# Patient Record
Sex: FEMALE | Race: WHITE | NOT HISPANIC OR LATINO | URBAN - METROPOLITAN AREA
[De-identification: names, ages, dates, MRNs, and addresses within clinical notes are randomized per-mention and may not be internally consistent; named-entity substitution may affect disease eponyms.]

---

## 2017-02-01 ENCOUNTER — EMERGENCY (EMERGENCY)
Facility: HOSPITAL | Age: 27
LOS: 1 days | Discharge: HOME | End: 2017-02-01
Attending: EMERGENCY MEDICINE | Admitting: EMERGENCY MEDICINE
Payer: SELF-PAY

## 2017-02-01 VITALS
SYSTOLIC BLOOD PRESSURE: 125 MMHG | DIASTOLIC BLOOD PRESSURE: 70 MMHG | HEIGHT: 64 IN | HEART RATE: 98 BPM | TEMPERATURE: 98 F | RESPIRATION RATE: 18 BRPM | WEIGHT: 145.06 LBS | OXYGEN SATURATION: 96 %

## 2017-02-01 DIAGNOSIS — J02.9 ACUTE PHARYNGITIS, UNSPECIFIED: ICD-10-CM

## 2017-02-01 DIAGNOSIS — R05 COUGH: ICD-10-CM

## 2017-02-01 LAB
ALBUMIN SERPL ELPH-MCNC: 3.7 G/DL — SIGNIFICANT CHANGE UP (ref 3.4–5)
ALP SERPL-CCNC: 78 U/L — SIGNIFICANT CHANGE UP (ref 40–120)
ALT FLD-CCNC: 16 U/L — SIGNIFICANT CHANGE UP (ref 12–42)
ANION GAP SERPL CALC-SCNC: 7 MMOL/L — LOW (ref 9–16)
AST SERPL-CCNC: 9 U/L — LOW (ref 15–37)
BILIRUB SERPL-MCNC: 1.1 MG/DL — SIGNIFICANT CHANGE UP (ref 0.2–1.2)
BUN SERPL-MCNC: 18 MG/DL — SIGNIFICANT CHANGE UP (ref 7–23)
CALCIUM SERPL-MCNC: 8.5 MG/DL — SIGNIFICANT CHANGE UP (ref 8.5–10.5)
CHLORIDE SERPL-SCNC: 105 MMOL/L — SIGNIFICANT CHANGE UP (ref 96–108)
CO2 SERPL-SCNC: 25 MMOL/L — SIGNIFICANT CHANGE UP (ref 22–31)
CREAT SERPL-MCNC: 0.88 MG/DL — SIGNIFICANT CHANGE UP (ref 0.5–1.3)
GLUCOSE SERPL-MCNC: 84 MG/DL — SIGNIFICANT CHANGE UP (ref 70–99)
HCG SERPL-ACNC: <1 MIU/ML — SIGNIFICANT CHANGE UP
HCT VFR BLD CALC: 38.1 % — SIGNIFICANT CHANGE UP (ref 34.5–45)
HGB BLD-MCNC: 13.2 G/DL — SIGNIFICANT CHANGE UP (ref 11.5–15.5)
MCHC RBC-ENTMCNC: 31.4 PG — SIGNIFICANT CHANGE UP (ref 27–34)
MCHC RBC-ENTMCNC: 34.6 G/DL — SIGNIFICANT CHANGE UP (ref 32–36)
MCV RBC AUTO: 90.5 FL — SIGNIFICANT CHANGE UP (ref 80–100)
PLATELET # BLD AUTO: 198 K/UL — SIGNIFICANT CHANGE UP (ref 150–400)
POTASSIUM SERPL-MCNC: 3.9 MMOL/L — SIGNIFICANT CHANGE UP (ref 3.5–5.3)
POTASSIUM SERPL-SCNC: 3.9 MMOL/L — SIGNIFICANT CHANGE UP (ref 3.5–5.3)
PROT SERPL-MCNC: 7.5 G/DL — SIGNIFICANT CHANGE UP (ref 6.4–8.2)
RAPID RVP RESULT: SIGNIFICANT CHANGE UP
RBC # BLD: 4.21 M/UL — SIGNIFICANT CHANGE UP (ref 3.8–5.2)
RBC # FLD: 12 % — SIGNIFICANT CHANGE UP (ref 10.3–16.9)
SODIUM SERPL-SCNC: 137 MMOL/L — SIGNIFICANT CHANGE UP (ref 135–145)
WBC # BLD: 7.8 K/UL — SIGNIFICANT CHANGE UP (ref 3.8–10.5)
WBC # FLD AUTO: 7.8 K/UL — SIGNIFICANT CHANGE UP (ref 3.8–10.5)

## 2017-02-01 PROCEDURE — 85027 COMPLETE CBC AUTOMATED: CPT

## 2017-02-01 PROCEDURE — 87581 M.PNEUMON DNA AMP PROBE: CPT

## 2017-02-01 PROCEDURE — 99283 EMERGENCY DEPT VISIT LOW MDM: CPT | Mod: 25

## 2017-02-01 PROCEDURE — 71046 X-RAY EXAM CHEST 2 VIEWS: CPT

## 2017-02-01 PROCEDURE — 94640 AIRWAY INHALATION TREATMENT: CPT

## 2017-02-01 PROCEDURE — 87486 CHLMYD PNEUM DNA AMP PROBE: CPT

## 2017-02-01 PROCEDURE — 87798 DETECT AGENT NOS DNA AMP: CPT

## 2017-02-01 PROCEDURE — 87633 RESP VIRUS 12-25 TARGETS: CPT

## 2017-02-01 PROCEDURE — 99284 EMERGENCY DEPT VISIT MOD MDM: CPT

## 2017-02-01 PROCEDURE — 84702 CHORIONIC GONADOTROPIN TEST: CPT

## 2017-02-01 PROCEDURE — 71020: CPT | Mod: 26

## 2017-02-01 PROCEDURE — 80053 COMPREHEN METABOLIC PANEL: CPT

## 2017-02-01 PROCEDURE — 36415 COLL VENOUS BLD VENIPUNCTURE: CPT

## 2017-02-01 RX ORDER — IPRATROPIUM/ALBUTEROL SULFATE 18-103MCG
3 AEROSOL WITH ADAPTER (GRAM) INHALATION ONCE
Qty: 0 | Refills: 0 | Status: COMPLETED | OUTPATIENT
Start: 2017-02-01 | End: 2017-02-01

## 2017-02-01 RX ORDER — ALBUTEROL 90 UG/1
2 AEROSOL, METERED ORAL
Qty: 3 | Refills: 0 | OUTPATIENT
Start: 2017-02-01 | End: 2017-03-03

## 2017-02-01 RX ADMIN — Medication 3 MILLILITER(S): at 18:51

## 2017-02-01 NOTE — ED PROVIDER NOTE - ENMT, MLM
Mild erythema as right posterior oropharynx. No sinus tenderness. Mild b/l posterior cervical lymphadenoapthy.

## 2017-02-01 NOTE — ED PROVIDER NOTE - OBJECTIVE STATEMENT
25yo F with no significant PMHx presents with 2 weeks of cough and feeling unwell. Reports symptoms started as rash on her head that was itchy as well as URI type symptoms including sore throat, productive cough, nasal congestion and body aches. She took a week of amoxicillin that she completed on Thursday but symptoms persisted. Most bothersome symptom is cough that was initially productive with whitish sputum now more dry cough, constant, not relieved with Robitussin. Associated with dyspnea while at rest. Had low grade temp on Sunday (99.1). Denies chills, lightheadedness, sinus pain, chest pain, abdominal pain, dysuria, diarrhea or other skin rash. No recent sick contacts or hospitalizations. Returned from [place] at end of December, no noticeable rashes or bites. Received flu shot in Sept/Oct.

## 2017-02-01 NOTE — ED PROVIDER NOTE - PROGRESS NOTE DETAILS
CXR with acute infiltrates. Labs unremarkable. Given nebulization with improvement in symptoms. symptoms likely 2/2 viral syndrome. Will give rx for albuterol.

## 2017-02-01 NOTE — ED PROVIDER NOTE - MEDICAL DECISION MAKING DETAILS
26yoF with no significant PMHx presents with worsening cough, dyspnea, sore throat and myalgias for 2 weeks despite completing week of amoxicillin. HD stable, afebrile. Mild erythema in posterior pharnyx but no exudates, lungs CTA b/l. Will check RVP, CBC, and CXR.

## 2017-02-01 NOTE — ED PROVIDER NOTE - ATTENDING CONTRIBUTION TO CARE
pt seen and examined by me, agree with above plan.  healthy 25 yo female with 2 weeks of dry cough, worse at night.  was initially treated with amoxicillin by pmd for ?throat infection.  no fever.  mild sob.  on exam well appearing, nad, heent normal, heart normal, lungs clear.  cxr normal.  probable viral uri with persistent cough.  pt may benefit from albuterol inhaler, fu pmd

## 2017-02-01 NOTE — ED PROVIDER NOTE - CONSTITUTIONAL, MLM
normal... Well appearing, well nourished, awake, alert, oriented to person, place, time/situation and in no apparent distress. Mildly flushed.

## 2017-06-26 ENCOUNTER — EMERGENCY (EMERGENCY)
Facility: HOSPITAL | Age: 27
LOS: 1 days | Discharge: HOME | End: 2017-06-26
Attending: EMERGENCY MEDICINE | Admitting: EMERGENCY MEDICINE
Payer: COMMERCIAL

## 2017-06-26 VITALS
TEMPERATURE: 98 F | SYSTOLIC BLOOD PRESSURE: 113 MMHG | DIASTOLIC BLOOD PRESSURE: 64 MMHG | OXYGEN SATURATION: 100 % | HEART RATE: 80 BPM | RESPIRATION RATE: 18 BRPM

## 2017-06-26 VITALS
WEIGHT: 149.91 LBS | HEART RATE: 107 BPM | TEMPERATURE: 98 F | RESPIRATION RATE: 16 BRPM | SYSTOLIC BLOOD PRESSURE: 132 MMHG | DIASTOLIC BLOOD PRESSURE: 85 MMHG | OXYGEN SATURATION: 97 %

## 2017-06-26 DIAGNOSIS — O20.9 HEMORRHAGE IN EARLY PREGNANCY, UNSPECIFIED: ICD-10-CM

## 2017-06-26 DIAGNOSIS — R00.0 TACHYCARDIA, UNSPECIFIED: ICD-10-CM

## 2017-06-26 DIAGNOSIS — Z3A.01 LESS THAN 8 WEEKS GESTATION OF PREGNANCY: ICD-10-CM

## 2017-06-26 DIAGNOSIS — Z79.899 OTHER LONG TERM (CURRENT) DRUG THERAPY: ICD-10-CM

## 2017-06-26 DIAGNOSIS — R10.2 PELVIC AND PERINEAL PAIN: ICD-10-CM

## 2017-06-26 LAB
HCT VFR BLD CALC: 37.8 % — SIGNIFICANT CHANGE UP (ref 34.5–45)
HGB BLD-MCNC: 12.8 G/DL — SIGNIFICANT CHANGE UP (ref 11.5–15.5)
MCHC RBC-ENTMCNC: 30 PG — SIGNIFICANT CHANGE UP (ref 27–34)
MCHC RBC-ENTMCNC: 33.9 G/DL — SIGNIFICANT CHANGE UP (ref 32–36)
MCV RBC AUTO: 88.7 FL — SIGNIFICANT CHANGE UP (ref 80–100)
PLATELET # BLD AUTO: 218 K/UL — SIGNIFICANT CHANGE UP (ref 150–400)
RBC # BLD: 4.26 M/UL — SIGNIFICANT CHANGE UP (ref 3.8–5.2)
RBC # FLD: 11.7 % — SIGNIFICANT CHANGE UP (ref 10.3–16.9)
WBC # BLD: 9.3 K/UL — SIGNIFICANT CHANGE UP (ref 3.8–10.5)
WBC # FLD AUTO: 9.3 K/UL — SIGNIFICANT CHANGE UP (ref 3.8–10.5)

## 2017-06-26 PROCEDURE — 76830 TRANSVAGINAL US NON-OB: CPT

## 2017-06-26 PROCEDURE — 76801 OB US < 14 WKS SINGLE FETUS: CPT | Mod: 26

## 2017-06-26 PROCEDURE — 84702 CHORIONIC GONADOTROPIN TEST: CPT

## 2017-06-26 PROCEDURE — 96375 TX/PRO/DX INJ NEW DRUG ADDON: CPT

## 2017-06-26 PROCEDURE — 81001 URINALYSIS AUTO W/SCOPE: CPT

## 2017-06-26 PROCEDURE — 86900 BLOOD TYPING SEROLOGIC ABO: CPT

## 2017-06-26 PROCEDURE — 76817 TRANSVAGINAL US OBSTETRIC: CPT

## 2017-06-26 PROCEDURE — 76801 OB US < 14 WKS SINGLE FETUS: CPT

## 2017-06-26 PROCEDURE — 99285 EMERGENCY DEPT VISIT HI MDM: CPT

## 2017-06-26 PROCEDURE — 76830 TRANSVAGINAL US NON-OB: CPT | Mod: 26

## 2017-06-26 PROCEDURE — 86850 RBC ANTIBODY SCREEN: CPT

## 2017-06-26 PROCEDURE — 99284 EMERGENCY DEPT VISIT MOD MDM: CPT | Mod: 25

## 2017-06-26 PROCEDURE — 85027 COMPLETE CBC AUTOMATED: CPT

## 2017-06-26 PROCEDURE — 85025 COMPLETE CBC W/AUTO DIFF WBC: CPT

## 2017-06-26 PROCEDURE — 36415 COLL VENOUS BLD VENIPUNCTURE: CPT

## 2017-06-26 PROCEDURE — 86901 BLOOD TYPING SEROLOGIC RH(D): CPT

## 2017-06-26 PROCEDURE — 96374 THER/PROPH/DIAG INJ IV PUSH: CPT

## 2017-06-26 PROCEDURE — 80053 COMPREHEN METABOLIC PANEL: CPT

## 2017-06-26 RX ORDER — MORPHINE SULFATE 50 MG/1
4 CAPSULE, EXTENDED RELEASE ORAL ONCE
Qty: 0 | Refills: 0 | Status: DISCONTINUED | OUTPATIENT
Start: 2017-06-26 | End: 2017-06-26

## 2017-06-26 RX ORDER — SODIUM CHLORIDE 9 MG/ML
500 INJECTION INTRAMUSCULAR; INTRAVENOUS; SUBCUTANEOUS ONCE
Qty: 0 | Refills: 0 | Status: COMPLETED | OUTPATIENT
Start: 2017-06-26 | End: 2017-06-26

## 2017-06-26 RX ORDER — METOCLOPRAMIDE HCL 10 MG
10 TABLET ORAL ONCE
Qty: 0 | Refills: 0 | Status: COMPLETED | OUTPATIENT
Start: 2017-06-26 | End: 2017-06-26

## 2017-06-26 RX ORDER — SODIUM CHLORIDE 9 MG/ML
1000 INJECTION INTRAMUSCULAR; INTRAVENOUS; SUBCUTANEOUS ONCE
Qty: 0 | Refills: 0 | Status: COMPLETED | OUTPATIENT
Start: 2017-06-26 | End: 2017-06-26

## 2017-06-26 RX ADMIN — SODIUM CHLORIDE 2000 MILLILITER(S): 9 INJECTION INTRAMUSCULAR; INTRAVENOUS; SUBCUTANEOUS at 16:38

## 2017-06-26 RX ADMIN — MORPHINE SULFATE 4 MILLIGRAM(S): 50 CAPSULE, EXTENDED RELEASE ORAL at 14:37

## 2017-06-26 RX ADMIN — Medication 104 MILLIGRAM(S): at 18:59

## 2017-06-26 RX ADMIN — MORPHINE SULFATE 4 MILLIGRAM(S): 50 CAPSULE, EXTENDED RELEASE ORAL at 16:38

## 2017-06-26 RX ADMIN — SODIUM CHLORIDE 1000 MILLILITER(S): 9 INJECTION INTRAMUSCULAR; INTRAVENOUS; SUBCUTANEOUS at 18:59

## 2017-06-26 RX ADMIN — MORPHINE SULFATE 4 MILLIGRAM(S): 50 CAPSULE, EXTENDED RELEASE ORAL at 14:10

## 2017-06-26 NOTE — CONSULT NOTE ADULT - SUBJECTIVE AND OBJECTIVE BOX
26yo  at 7w1d by LMP 17 presents with vaginal bleeding and pelvic cramps which started today at noon. She recently missed a period though said she sometimes has irregular periods due to stress. She is on Nuvaring for 2 months and is compliant. Due to the missed period she took at home pregnancy test which was positive and saw her PCP who did a serum pregnancy test which was positive. She has an appointment scheduled with her OBGYN in [place]. 28yo  at 7w1d by LMP 17 presents with vaginal bleeding and pelvic cramps which started today at noon. She recently missed a period though said she sometimes has irregular periods due to stress. She is on Nuvaring for 2 months and is compliant. Due to the missed period she took a home pregnancy test which was positive and saw her PCP who did a serum pregnancy test which was positive. She has an appointment scheduled for tomorrow with her OBGYN in [place]. This is an unplanned but desired pregnancy.    Today around noon she started bleeding and having central pelvic cramps. She has no other symptoms. Told by her Dr to go to ED due to bleeding.    PMH: anxiety/depression, does not see a psychiatrist, currently doing well, no meds, previously lexapro  PSH: none  OB: none  GYN: none  MEDS: none  ALLERGIES: lexapro ("bad rxn", ?anaphylaxis)    T 98.5 >89>61 /85>121/76>104/71   GEN: lying down, appears pale however patient states she has "been crying"  ABD: soft, nontender, no rebound, no guarding  PELVIC: on sterile speculum exam, the cervix is visually closed, there is approx 10cc blood in the vault  On bimanual, the uterus is firm and mobile, cervix closed though very posterior, no adnexal masses, non tender    W 7.0 Hb 13.4 P 255  lytes wnl  Cr 0.80  A/A 14/11  UA rbc, blood, trace protein, +laurie  O positive  beta hcg 36.6    PELVIC SONO  Transabdominal and transvaginal imaging of the pelvis was performed. The   uterus is normal in size, measuring 8.5 x 3.7 x 3.1 cm. No intrauterine   gestation is seen. There is a small amount of hypoechoic material within   the endometrial canal, possibly blood products. The endometrium is of   normal thickness, measuring 0.8 cm. The cervix is closed and measures 2.9   cm.    The ovaries are normal in size and appearance bilaterally, with the right   ovary measuring 2.4 x 2.0 x 1.6 cm and the left ovary measuring 3.3 x 1.8   x 2.6 cm. Arterial and venous blood flow documented within each ovary on   Doppler imaging. On initial images, the ultrasound technician thought   that there may have been a solid nodule separate from the left ovary in   the left adnexa. However, upon my scanning of the patient, that suspected   nodule appeared to undergo peristalsis and is felt to represent a bowel   loop and not an ectopic pregnancy.    There is no free fluid in the cul-de-sac.    Impression: Pregnancy of uncertain location, as neither an intrauterine   or extrauterine gestation is seen. The differential diagnosis includes an   early normal intrauterine gestation that is too small to visualize, an   incomplete , and an ectopic pregnancy that is not visualized.   Recommend correlation with serial serum beta-hCG levels and follow-up   pelvic ultrasound exam in one week.

## 2017-06-26 NOTE — ED PROVIDER NOTE - NS ED ROS FT
CONSTITUTIONAL: no weakness, fever, or chills.    SKIN: no rashes or itching.    EYES: no visual changes.    ENT: no rhinorrhea or sore throat.    PULMONARY: no cough or difficulty breathing.   CARDIOVASCULAR: no chest pain or palpitations.   GASTROINTESTINAL:  no vomiting or diarrhea. no blood in stool or melena.    GENITOURINARY: no dysuria, frequency, or hematuria.   MUSCULOSKELETAL: no neck/back pain. no pain or swelling in legs.    NEURO:  no headache/dizziness.  no tingling/numbness/focal weakness.    ENDOCRINE: no excessive thirst or urination. no significant weight change.

## 2017-06-26 NOTE — ED ADULT TRIAGE NOTE - CHIEF COMPLAINT QUOTE
Pt states she's 6 weeks pregnant, having abdominal pain and started having vaginal bleeding 15 mins ago.

## 2017-06-26 NOTE — ED PROVIDER NOTE - ATTENDING CONTRIBUTION TO CARE
28yo F  LMP 2017 +serum pregnancy last week, no US yet, here today with 1 hour acute onset pelvic pain, 30 min vaginal bleeding akin to that of her normal period. On exam in pain, appear uncomfortable, mildly tachycardic. plan for labs, US, gyn eval.

## 2017-06-26 NOTE — ED PROVIDER NOTE - MEDICAL DECISION MAKING DETAILS
pt with early pregnancy, having acute pelvic pain and VB today.  low HCG, US with no IUP/EUP seen.  remained HD stable, evaluated by GYN.  needs repeat HCG and possible US in 48 hours.  DDx: ectopic pregnancy, miscarriage, threatened miscarriage; low suspicion for intermittent ov torsion.

## 2017-06-26 NOTE — ED PROVIDER NOTE - PHYSICAL EXAMINATION
CONSTITUTIONAL: awake alert, appears uncomfortable    SKIN: Normal color and turgor. No rash.    EYES: Conjunctiva clear. EOMI. PERRL.    ENT: Airway patent, OP clear. Nasal mucosa clear, no rhinorrhea.   RESPIRATORY:  Breathing non-labored. No retractions, accessory muscle use.  Lungs CTA bilaterally.  CARDIOVASCULAR:  RRR, S1S2. No M/R/G.      GI:   Bowel sounds present. Abdomen soft, nontender.    : mod amt blood in vault.  os closed.  no CMT.  no adnexal mass.  MSK: No joint swelling.  No neck or back tenderness. Painless FROM.  No lower extremity edema or calf tenderness.    NEURO: Alert and oriented; HOGAN without gross abnormalities.  Normal speech and gait.

## 2017-06-26 NOTE — ED PROVIDER NOTE - PROGRESS NOTE DETAILS
called radiology, US ready.  will send up now. Pain currently well controlled. GYN evaluating. Seen by GYN.  Pregnancy of undetermined location.  Will repeat CBC; if remains HD stable pt will be DC home, to return to ED for repeat HCG in 48 hours. Pt nauseated and has mild headache.  Will give IV reglan prior to discharge. Headache and nausea resolved.  Pt wants to go home; will stay with boyfriend.  She will return to ER in 2 days for repeat labs. Pt nauseated and has mild headache; onset gradual.  Will give IV reglan prior to discharge.

## 2017-06-26 NOTE — ED PROVIDER NOTE - OBJECTIVE STATEMENT
pt with positive serum preg test last week, developed acute pelvic pain apx 1 hour ago.  pain located central pelvis.  no radiation of pain.  began having vaginal bleeding apx 30 min ago, no clots, about as heavy as a normal period.  First pregnancy   LMP 17  No hx STD

## 2019-10-07 NOTE — ED PROVIDER NOTE - PROGRESS NOTE ADDITIONAL3
October 7, 2019      Gerard Cavazos III, MD  [address]  [address]  [facility] [address]           [facility]  [address]  [facility] [address]  Phone: [phone]  Fax: [phone]          Patient: Octavia Arrington   MR Number: [id number]   YOB: 1957   Date of Visit: 10/7/2019       Dear Dr. Gerard Cavazos III:    Thank you for referring Octavia Arrington to me for evaluation. Attached you will find relevant portions of my assessment and plan of care.    If you have questions, please do not hesitate to call me. I look forward to following Octavia Arrington along with you.    Sincerely,    Ning Cornejo, OD    Enclosure  CC:  No Recipients    If you would like to receive this communication electronically, please contact [email or web address] or [phone] to request more information on [employer] Link access.    For providers and/or their staff who would like to refer a patient to [facility], please contact us through our one-stop-shop provider referral line, [facility], at [phone].    If you feel you have received this communication in error or would no longer like to receive these types of communications, please e-mail [email or web address]         
Additional Progress Note...

## 2025-05-09 NOTE — ED ADULT NURSE NOTE - MODE OF DISCHARGE
[facility] - Med Surg  Discharge Final Note    Primary Care Provider: Mima Mack MD    Expected Discharge Date: 5/8/2025      Patient discharged home with spouse and no needs form case management.    Discharge Plan A and Plan B have been determined by review of patient's clinical status, future medical and therapeutic needs, and coverage/benefits for post-acute care in coordination with multidisciplinary team members.    Future Appointments   Date Time Provider Department Center   5/12/2025  8:30 AM LAB, [facility] LAB [facility]   5/14/2025 10:00 AM EPO INJECTION SCHEDULE [facility] NEPHRO [facility]   5/15/2025 11:00 AM Karan Lopez, APRN, FNP [facility] IM [facility] PCW   5/20/2025  9:30 AM Mima Mack MD [facility] IM [facility] PC[facility]   6[facility]3/2025 10:00 [facility]   6/12/2025  9:00 AM 3PREP, [facility] SSCU [facility]   6/12/2025 11:00 AM INPATIENT, [facility] ECHOSTR [facility]   6/16/2025 10:30 AM Nayely Vital PA-C [facility] CARDIO [facility]   6/17/2025 10:00 AM Argelia Bridges RD [facility] INTLDIA [facility]       Final Discharge Note (most recent)       Final Note - 05/09/25 1217          Final Note    Assessment Type Final Discharge Note (P)      Anticipated Discharge Disposition Home or Self Care (P)      What phone number can be called within the next 1-3 days to see how you are doing after discharge? [phone] (P)         Post-Acute Status    Post-Acute Authorization Other (P)      Coverage [employer] MANAGED MEDICARE - [employer] MEDICARE HMO - CAPITATED (P)      Other Status No Post-Acute Service Needs (P)      Discharge Delays None known at this time (P)                      Important Message from Medicare             Contact Info       Kenneth Provider   Specialty: Internal Medicine    [address] [facility]  [address]  [place] [address]       Next Steps: Follow up          H[facility]  Case Management  [phone]       Ambulatory